# Patient Record
Sex: MALE | Race: WHITE | Employment: UNEMPLOYED | URBAN - METROPOLITAN AREA
[De-identification: names, ages, dates, MRNs, and addresses within clinical notes are randomized per-mention and may not be internally consistent; named-entity substitution may affect disease eponyms.]

---

## 2021-01-08 ENCOUNTER — OFFICE VISIT (OUTPATIENT)
Dept: URGENT CARE | Age: 31
End: 2021-01-08

## 2021-01-08 VITALS — HEART RATE: 74 BPM | OXYGEN SATURATION: 98 % | RESPIRATION RATE: 16 BRPM | TEMPERATURE: 98.4 F

## 2021-01-08 DIAGNOSIS — R51.9 INTRACTABLE HEADACHE, UNSPECIFIED CHRONICITY PATTERN, UNSPECIFIED HEADACHE TYPE: ICD-10-CM

## 2021-01-08 DIAGNOSIS — Z11.59 SCREENING FOR VIRAL DISEASE: Primary | ICD-10-CM

## 2021-01-08 PROCEDURE — 99202 OFFICE O/P NEW SF 15 MIN: CPT | Performed by: NURSE PRACTITIONER

## 2021-01-08 NOTE — LETTER
[address] [place] [id number] Work/School Note Date: 1/8/2021 To Whom It May concern: 
 
 
 
Julia Warner Was seen and evaluated in the Urgent Care today with COVID testing ordered. He was advised to quarantine per CDC recommendations as the COVID results are pending. Sincerely, Nika Mireles NP

## 2021-01-08 NOTE — LETTER
NOTIFICATION RETURN TO WORK / SCHOOL 
 
1/11/2021 1:08 PM 
 
Mr. Morro Bhat [address] [place] [address] To Whom It May Concern: 
 
Morro Bhat was seen on 1/8/21 for Headache. He was tested for Covid and and it resulted negative. He will return to work/school on: 1/11/21 If there are questions or concerns please have the patient contact our office. Sincerely, Pratibha English MD

## 2021-01-08 NOTE — PROGRESS NOTES
26 yo M with no previous medical hx presents for evaluation of intermittent HA x 3 weeks. He reports no hx of migraine HA but believes that this is what has been occurring. He states sx are \"all over my head\" but primarily located in B temples and has pain for approx 5 min in L side of head which is associated with nothing and has been improved with Ibuprofen. Pt states he started a new job approx 3 weeks ago and has to wear a mask constantly. He states he awakens with sx when they occur. He denies new medications but states he has recently started creatine. Denies that he ever has HA while working out. States he drinks about a gallon of water a day. Denies change in diet, medications, amount of caffeine intake. Denies sinus sx, weakness, N/V, dizziness, photophobia, change in vision. PCP:  None     History reviewed. No pertinent past medical history.     Social History    Socioeconomic History      Marital status: UNKNOWN      Spouse name: Not on file      Number of children: Not on file      Years of education: Not on file      Highest education level: Not on file    Occupational History      Not on file    Social Needs      Financial resource strain: Not on file      Food insecurity        Worry: Not on file        Inability: Not on file      Transportation needs        Medical: Not on file        Non-medical: Not on file    Tobacco Use      Smoking status: Never Smoker      Smokeless tobacco: Never Used    Substance and Sexual Activity      Alcohol use: Not on file      Drug use: Not on file      Sexual activity: Not on file    Lifestyle      Physical activity        Days per week: Not on file        Minutes per session: Not on file      Stress: Not on file    Relationships      Social connections        Talks on phone: Not on file        Gets together: Not on file        Attends [employer] service: Not on file        Active member of club or organization: Not on file        Attends meetings of clubs or organizations: Not on file        Relationship status: Not on file      Intimate partner violence        Fear of current or ex partner: Not on file        Emotionally abused: Not on file        Physically abused: Not on file        Forced sexual activity: Not on file    Other Topics      Concerns:        Not on file    Social History Narrative      Not on file      The history is provided by the patient. No  was used. Headache   The history is provided by the patient. The headache is aggravated by nothing. The pain is located in the left unilateral and temporal region. Pertinent negatives include no anorexia, no fever, no malaise/fatigue, no chest pressure, no near-syncope, no orthopnea, no palpitations, no syncope, no shortness of breath, no weakness, no tingling, no dizziness, no visual change, no nausea and no vomiting. He has tried NSAIDs for the symptoms. The treatment provided significant relief. History reviewed. No pertinent past medical history. History reviewed. No pertinent surgical history. History reviewed. No pertinent family history.      Social History     Socioeconomic History    Marital status: UNKNOWN     Spouse name: Not on file    Number of children: Not on file    Years of education: Not on file    Highest education level: Not on file   Occupational History    Not on file   Social Needs    Financial resource strain: Not on file    Food insecurity     Worry: Not on file     Inability: Not on file    Transportation needs     Medical: Not on file     Non-medical: Not on file   Tobacco Use    Smoking status: Never Smoker    Smokeless tobacco: Never Used   Substance and Sexual Activity    Alcohol use: Not on file    Drug use: Not on file    Sexual activity: Not on file   Lifestyle    Physical activity     Days per week: Not on file     Minutes per session: Not on file    Stress: Not on file   Relationships    Social connections     Talks on phone: Not on file     Gets together: Not on file     Attends Worship service: Not on file     Active member of club or organization: Not on file     Attends meetings of clubs or organizations: Not on file     Relationship status: Not on file    Intimate partner violence     Fear of current or ex partner: Not on file     Emotionally abused: Not on file     Physically abused: Not on file     Forced sexual activity: Not on file   Other Topics Concern    Not on file   Social History Narrative    Not on file                ALLERGIES: Patient has no known allergies. Review of Systems   Constitutional: Negative for appetite change, chills, fatigue, fever and malaise/fatigue. HENT: Negative for congestion and facial swelling. Eyes: Negative for photophobia, pain, discharge, redness, itching and visual disturbance. Respiratory: Negative for cough and shortness of breath. Cardiovascular: Negative for palpitations, orthopnea, leg swelling, syncope and near-syncope. Gastrointestinal: Negative for anorexia, nausea and vomiting. Skin: Negative for color change. Neurological: Positive for headaches. Negative for dizziness, tingling, seizures, facial asymmetry, weakness, light-headedness and numbness. Psychiatric/Behavioral: Negative for confusion. Vitals:    01/08/21 1001   Pulse: 74   Resp: 16   Temp: 98.4 °F (36.9 °C)   SpO2: 98%       Physical Exam  Vitals signs and nursing note reviewed. Constitutional:       General: He is not in acute distress. Appearance: Normal appearance. He is well-developed. He is not ill-appearing or diaphoretic. Cardiovascular:      Rate and Rhythm: Normal rate. Pulmonary:      Effort: Pulmonary effort is normal. No respiratory distress. Neurological:      Mental Status: He is alert and oriented to person, place, and time. Mental status is at baseline. GCS: GCS eye subscore is 4. GCS verbal subscore is 5. GCS motor subscore is 6.       Sensory: Sensation is intact. Motor: Motor function is intact. Coordination: Coordination is intact. Psychiatric:         Behavior: Behavior is cooperative. MDM     Amount and/or Complexity of Data Reviewed:   Clinical lab tests:  Ordered (COVID testing )          ICD-10-CM ICD-9-CM    1. Screening for viral disease  Z11.59 V73.99 NOVEL CORONAVIRUS (COVID-19)   2. Intractable headache, unspecified chronicity pattern, unspecified headache type  R51.9 784.0 NOVEL CORONAVIRUS (COVID-19)     No orders of the defined types were placed in this encounter. No results found for any visits on 01/08/21. The patients condition was discussed with the patient and they understand. The patient is to follow up with primary care doctor. If signs and symptoms become worse the pt is to go to the ER. The patient is to take medications as prescribed. Pt requests COVID testing which was performed. He was advised to quarantine per CDC guidelines. He was advised to start headache journal, establish w/ PCP and that additional testing may need to be performed or may need neurology evaluation if sx persist.  He has been advised to seek immediate medical attention for worsening sx.     Procedures

## 2021-01-10 LAB — SARS-COV-2, NAA: NOT DETECTED

## 2021-10-28 ENCOUNTER — HOSPITAL ENCOUNTER (OUTPATIENT)
Dept: PHYSICAL THERAPY | Age: 31
Discharge: HOME | End: 2021-10-28

## 2021-10-28 PROCEDURE — 97150 GROUP THERAPEUTIC PROCEDURES: CPT
